# Patient Record
Sex: MALE | Race: WHITE | NOT HISPANIC OR LATINO | Employment: FULL TIME | URBAN - METROPOLITAN AREA
[De-identification: names, ages, dates, MRNs, and addresses within clinical notes are randomized per-mention and may not be internally consistent; named-entity substitution may affect disease eponyms.]

---

## 2017-11-09 PROBLEM — R00.0 TACHYCARDIA: Status: ACTIVE | Noted: 2017-11-09

## 2020-01-20 PROBLEM — E66.01 SEVERE OBESITY (BMI >= 40): Status: ACTIVE | Noted: 2020-01-20

## 2020-01-20 PROBLEM — L03.90 CELLULITIS: Status: ACTIVE | Noted: 2020-01-20

## 2020-01-20 PROBLEM — N49.2 CELLULITIS, SCROTUM: Status: ACTIVE | Noted: 2020-01-20

## 2020-01-21 PROBLEM — D64.9 ANEMIA: Status: ACTIVE | Noted: 2020-01-21

## 2020-01-22 ENCOUNTER — HOSPITAL ENCOUNTER (OUTPATIENT)
Facility: OTHER | Age: 30
Discharge: HOME | End: 2020-01-23
Attending: INTERNAL MEDICINE | Admitting: HOSPITALIST

## 2020-01-22 DIAGNOSIS — N49.2 CELLULITIS, SCROTUM: ICD-10-CM

## 2020-01-22 DIAGNOSIS — N49.2 CELLULITIS OF SCROTUM: ICD-10-CM

## 2020-01-22 PROCEDURE — 99220 PR INITIAL OBSERVATION CARE,LEVL III: CPT | Mod: ,,, | Performed by: PHYSICIAN ASSISTANT

## 2020-01-22 PROCEDURE — 99220 PR INITIAL OBSERVATION CARE,LEVL III: ICD-10-PCS | Mod: ,,, | Performed by: PHYSICIAN ASSISTANT

## 2020-01-22 PROCEDURE — G0378 HOSPITAL OBSERVATION PER HR: HCPCS

## 2020-01-22 PROCEDURE — 11000001 HC ACUTE MED/SURG PRIVATE ROOM

## 2020-01-22 PROCEDURE — G0379 DIRECT REFER HOSPITAL OBSERV: HCPCS

## 2020-01-22 RX ORDER — CITALOPRAM 10 MG/1
10 TABLET ORAL NIGHTLY
Status: DISCONTINUED | OUTPATIENT
Start: 2020-01-22 | End: 2020-01-23 | Stop reason: HOSPADM

## 2020-01-22 RX ORDER — ONDANSETRON 2 MG/ML
4 INJECTION INTRAMUSCULAR; INTRAVENOUS EVERY 6 HOURS PRN
Status: DISCONTINUED | OUTPATIENT
Start: 2020-01-22 | End: 2020-01-23 | Stop reason: HOSPADM

## 2020-01-22 RX ORDER — ENOXAPARIN SODIUM 100 MG/ML
40 INJECTION SUBCUTANEOUS EVERY 12 HOURS
Status: DISCONTINUED | OUTPATIENT
Start: 2020-01-22 | End: 2020-01-23 | Stop reason: HOSPADM

## 2020-01-22 RX ORDER — KETOROLAC TROMETHAMINE 30 MG/ML
30 INJECTION, SOLUTION INTRAMUSCULAR; INTRAVENOUS EVERY 6 HOURS PRN
Status: DISPENSED | OUTPATIENT
Start: 2020-01-22 | End: 2020-01-23

## 2020-01-22 RX ORDER — MORPHINE SULFATE 2 MG/ML
1 INJECTION, SOLUTION INTRAMUSCULAR; INTRAVENOUS EVERY 6 HOURS PRN
Status: DISCONTINUED | OUTPATIENT
Start: 2020-01-22 | End: 2020-01-23 | Stop reason: HOSPADM

## 2020-01-22 RX ORDER — TALC
6 POWDER (GRAM) TOPICAL NIGHTLY PRN
Status: DISCONTINUED | OUTPATIENT
Start: 2020-01-22 | End: 2020-01-23 | Stop reason: HOSPADM

## 2020-01-22 RX ORDER — METOPROLOL TARTRATE 50 MG/1
50 TABLET ORAL 2 TIMES DAILY
Status: DISCONTINUED | OUTPATIENT
Start: 2020-01-22 | End: 2020-01-23 | Stop reason: HOSPADM

## 2020-01-22 RX ORDER — SODIUM CHLORIDE 0.9 % (FLUSH) 0.9 %
10 SYRINGE (ML) INJECTION
Status: DISCONTINUED | OUTPATIENT
Start: 2020-01-22 | End: 2020-01-23 | Stop reason: HOSPADM

## 2020-01-22 RX ORDER — SODIUM CHLORIDE, SODIUM LACTATE, POTASSIUM CHLORIDE, CALCIUM CHLORIDE 600; 310; 30; 20 MG/100ML; MG/100ML; MG/100ML; MG/100ML
INJECTION, SOLUTION INTRAVENOUS CONTINUOUS
Status: DISCONTINUED | OUTPATIENT
Start: 2020-01-22 | End: 2020-01-23

## 2020-01-22 RX ORDER — ACETAMINOPHEN 325 MG/1
650 TABLET ORAL EVERY 4 HOURS PRN
Status: DISCONTINUED | OUTPATIENT
Start: 2020-01-22 | End: 2020-01-23 | Stop reason: HOSPADM

## 2020-01-22 RX ORDER — KETOROLAC TROMETHAMINE 30 MG/ML
15 INJECTION, SOLUTION INTRAMUSCULAR; INTRAVENOUS EVERY 6 HOURS PRN
Status: ACTIVE | OUTPATIENT
Start: 2020-01-22 | End: 2020-01-23

## 2020-01-22 RX ORDER — HYDRALAZINE HYDROCHLORIDE 20 MG/ML
10 INJECTION INTRAMUSCULAR; INTRAVENOUS EVERY 6 HOURS PRN
Status: DISCONTINUED | OUTPATIENT
Start: 2020-01-22 | End: 2020-01-23 | Stop reason: HOSPADM

## 2020-01-22 NOTE — PLAN OF CARE
Outside Transfer Acceptance Note        Patients name/MRN:   Germán De Leon, MRN: [id number]     Referring Physician or Mid-Level provider giving report:   Dr. Martha Frederick     Referral Facility:   [facility], inpatient, admit date of 1/20/20     Date/Time of Acceptance:  1/22/20 at 12:30pm     Accepting Physician for admission to hospital: ASIM Ott MD ()     Accepting facility:  [facility], inpatient     Consulting Physicians from [facility] involved in case:  Dr. Tobin, Urology at [facility]  Dr. Andrea Jimenez, Hospitalist at [facility]    Reason for transfer request:   Scrotal abscess, needs Urology services  Chief Complaint:        Chief Complaint   Patient presents with    Abscess       groin         HPI: Patient is a 29 y.o. CM with PMHx of HTN and morbid obesity who presented to the ED with scrotal pain. Patient states that he had 6/10 left sided scrotal pain 1 day PTA, progressively the same. Patient endorses pain that he describes as aching in quality, non-radiating and constant in nature. The pain is exacerbated by movement and palpation. His mother and himself attempted to drain the lesion with a needle (not sterilized, reportedly cleaned by peroxide) but reportedly only expressed blood.  He had a CT pelvis at admission which revealed cutaneous ulceration and skin thickening of scrotum, but no perineal abscess identified.  He was initially given a dose of Vanco and Zosyn, then placed on IV clindamycin.    A follow up US was done of the scrotum today which revealed edema and increased vascularity throughout the subcutaneous soft tissues of the left scrotal tissues with an associated somewhat focal area of complex material measuring 2.1 x 1.3 x 3.7 cm with a tract to the skin surface.  Due to a newly discovered abscess, the sending facility is requesting Urology services.  His WBC has decreased since admission from 16 to 10 today.     To Do List upon arrival:    1) Consult  "Urology  2) Consider changing antibiotics back to Zosyn and Vancomycin until cultures return (I discussed this with the sending MD, who will give a dose now)     VS: /67   Pulse 75   Temp 98.6 °F (37 °C)   Resp 20   Ht 5' 8" (1.727 m)   Wt (!) 148 kg (326 lb 4.5 oz)   SpO2 99%   BMI 49.61 kg/m²    Past Medical History/Surgical History:   Past Medical History:   Diagnosis Date    Hypertension     Morbid obesity with BMI of 45.0-49.9, adult 12/26/2014    Tachycardia      Review of patient's allergies indicates:   Allergen Reactions    Tramadol Other (See Comments)     heartburn     Current Facility-Administered Medications:     acetaminophen tablet 650 mg, 650 mg, Oral, Q4H PRN, Martha Frederick MD, 650 mg at 01/21/20 1727    citalopram tablet 10 mg, 10 mg, Oral, QHS, Crystal Maloney MD, 10 mg at 01/21/20 2122    clindamycin 900 MG/50 ML D5W 900 mg/50 mL IVPB 900 mg, 900 mg, Intravenous, Q8H, Martha Frederick MD    enoxaparin injection 40 mg, 40 mg, Subcutaneous, Q12H, Martha Frederick MD, 40 mg at 01/22/20 0841    hydrALAZINE injection 10 mg, 10 mg, Intravenous, Q6H PRN, Martha Frederick MD    ketorolac injection 15 mg, 15 mg, Intravenous, Q6H PRN, Martha Frederick MD, 15 mg at 01/20/20 1450    ketorolac injection 30 mg, 30 mg, Intravenous, Q6H PRN, Martha Frederick MD, 30 mg at 01/21/20 0851    lactated ringers infusion, , Intravenous, Continuous, Martha Frederick MD, Last Rate: 200 mL/hr at 01/22/20 0841    lisinopril tablet 30 mg, 30 mg, Oral, Daily, Martha Frederick MD, 30 mg at 01/22/20 0841    melatonin tablet 6 mg, 6 mg, Oral, Nightly PRN, Crystal Maloney MD, 6 mg at 01/21/20 2122    metoprolol tartrate (LOPRESSOR) tablet 50 mg, 50 mg, Oral, BID, Martha Frederick MD, 50 mg at 01/22/20 0841    morphine injection 1 mg, 1 mg, Intravenous, Q6H PRN, Martha Frederick MD    ondansetron injection 4 mg, 4 mg, Intravenous, Q6H PRN, Martha Frederick MD    " promethazine (PHENERGAN) 6.25 mg in dextrose 5 % 50 mL IVPB, 6.25 mg, Intravenous, Q6H PRN, Martha Frederick MD    sodium chloride 0.9% flush 10 mL, 10 mL, Intravenous, PRN, Marhta Frederick MD    Imaging:      CT PELVIS WITH  CONTRAST    CLINICAL HISTORY:  Left testicular/perineal pain and swelling; evaluate for testicular abscess, perineal infection, gas in tissue;    TECHNIQUE:  5 mm contiguous axial images are performed through the pelvis following administration of IV contrast.    COMPARISON:  12/28/2019    FINDINGS:  CT scan pelvis:    Scans through the pelvis show no lymphadenopathy or free fluid.    Cutaneous ulceration and skin thickening of the scrotum is noted most pronounced on the left side.    Bone windows are unremarkable      Impression       1.  Cutaneous ulceration and skin thickening of scrotum, no perineal abscess identified      Electronically signed by: Cyrus Crespo MD  Date: 01/20/2020  Time: 06:35                       ASIM Ott MD  Department of Hospital Medicine  Patient Flow Center/   [phone]

## 2020-01-23 VITALS
SYSTOLIC BLOOD PRESSURE: 153 MMHG | OXYGEN SATURATION: 95 % | HEIGHT: 68 IN | WEIGHT: 315 LBS | BODY MASS INDEX: 47.74 KG/M2 | HEART RATE: 63 BPM | RESPIRATION RATE: 18 BRPM | TEMPERATURE: 98 F | DIASTOLIC BLOOD PRESSURE: 75 MMHG

## 2020-01-23 PROBLEM — N49.2 CELLULITIS OF SCROTUM: Status: ACTIVE | Noted: 2020-01-23

## 2020-01-23 PROBLEM — N49.2 SCROTAL ABSCESS: Status: ACTIVE | Noted: 2020-01-23

## 2020-01-23 LAB
ALBUMIN SERPL BCP-MCNC: 2.9 G/DL (ref 3.5–5.2)
ALP SERPL-CCNC: 65 U/L (ref 55–135)
ALT SERPL W/O P-5'-P-CCNC: 20 U/L (ref 10–44)
ANION GAP SERPL CALC-SCNC: 9 MMOL/L (ref 8–16)
AST SERPL-CCNC: 14 U/L (ref 10–40)
BASOPHILS # BLD AUTO: 0.03 K/UL (ref 0–0.2)
BASOPHILS NFR BLD: 0.3 % (ref 0–1.9)
BILIRUB SERPL-MCNC: 0.6 MG/DL (ref 0.1–1)
BUN SERPL-MCNC: 10 MG/DL (ref 6–20)
CALCIUM SERPL-MCNC: 9.5 MG/DL (ref 8.7–10.5)
CHLORIDE SERPL-SCNC: 103 MMOL/L (ref 95–110)
CO2 SERPL-SCNC: 30 MMOL/L (ref 23–29)
CREAT SERPL-MCNC: 0.9 MG/DL (ref 0.5–1.4)
DIFFERENTIAL METHOD: ABNORMAL
EOSINOPHIL # BLD AUTO: 0.3 K/UL (ref 0–0.5)
EOSINOPHIL NFR BLD: 2.9 % (ref 0–8)
ERYTHROCYTE [DISTWIDTH] IN BLOOD BY AUTOMATED COUNT: 12.2 % (ref 11.5–14.5)
EST. GFR  (AFRICAN AMERICAN): >60 ML/MIN/1.73 M^2
EST. GFR  (NON AFRICAN AMERICAN): >60 ML/MIN/1.73 M^2
GLUCOSE SERPL-MCNC: 109 MG/DL (ref 70–110)
HCT VFR BLD AUTO: 40.7 % (ref 40–54)
HGB BLD-MCNC: 13 G/DL (ref 14–18)
IMM GRANULOCYTES # BLD AUTO: 0.04 K/UL (ref 0–0.04)
IMM GRANULOCYTES NFR BLD AUTO: 0.4 % (ref 0–0.5)
LYMPHOCYTES # BLD AUTO: 1.7 K/UL (ref 1–4.8)
LYMPHOCYTES NFR BLD: 17.2 % (ref 18–48)
MAGNESIUM SERPL-MCNC: 2 MG/DL (ref 1.6–2.6)
MCH RBC QN AUTO: 28.6 PG (ref 27–31)
MCHC RBC AUTO-ENTMCNC: 31.9 G/DL (ref 32–36)
MCV RBC AUTO: 90 FL (ref 82–98)
MONOCYTES # BLD AUTO: 1 K/UL (ref 0.3–1)
MONOCYTES NFR BLD: 9.7 % (ref 4–15)
NEUTROPHILS # BLD AUTO: 6.8 K/UL (ref 1.8–7.7)
NEUTROPHILS NFR BLD: 69.5 % (ref 38–73)
NRBC BLD-RTO: 0 /100 WBC
PHOSPHATE SERPL-MCNC: 4.7 MG/DL (ref 2.7–4.5)
PLATELET # BLD AUTO: 321 K/UL (ref 150–350)
PMV BLD AUTO: 9.2 FL (ref 9.2–12.9)
POTASSIUM SERPL-SCNC: 4.6 MMOL/L (ref 3.5–5.1)
PROT SERPL-MCNC: 6.7 G/DL (ref 6–8.4)
RBC # BLD AUTO: 4.55 M/UL (ref 4.6–6.2)
SODIUM SERPL-SCNC: 142 MMOL/L (ref 136–145)
WBC # BLD AUTO: 9.82 K/UL (ref 3.9–12.7)

## 2020-01-23 PROCEDURE — 87186 SC STD MICRODIL/AGAR DIL: CPT

## 2020-01-23 PROCEDURE — 99217 PR OBSERVATION CARE DISCHARGE: CPT | Mod: ,,, | Performed by: PHYSICIAN ASSISTANT

## 2020-01-23 PROCEDURE — 84100 ASSAY OF PHOSPHORUS: CPT

## 2020-01-23 PROCEDURE — 87077 CULTURE AEROBIC IDENTIFY: CPT

## 2020-01-23 PROCEDURE — 87070 CULTURE OTHR SPECIMN AEROBIC: CPT

## 2020-01-23 PROCEDURE — 83735 ASSAY OF MAGNESIUM: CPT

## 2020-01-23 PROCEDURE — 36415 COLL VENOUS BLD VENIPUNCTURE: CPT

## 2020-01-23 PROCEDURE — 96374 THER/PROPH/DIAG INJ IV PUSH: CPT

## 2020-01-23 PROCEDURE — 63600175 PHARM REV CODE 636 W HCPCS: Performed by: PHYSICIAN ASSISTANT

## 2020-01-23 PROCEDURE — 25000003 PHARM REV CODE 250: Performed by: STUDENT IN AN ORGANIZED HEALTH CARE EDUCATION/TRAINING PROGRAM

## 2020-01-23 PROCEDURE — 25000003 PHARM REV CODE 250: Performed by: PHYSICIAN ASSISTANT

## 2020-01-23 PROCEDURE — G0378 HOSPITAL OBSERVATION PER HR: HCPCS

## 2020-01-23 PROCEDURE — 99217 PR OBSERVATION CARE DISCHARGE: ICD-10-PCS | Mod: ,,, | Performed by: PHYSICIAN ASSISTANT

## 2020-01-23 PROCEDURE — 87075 CULTR BACTERIA EXCEPT BLOOD: CPT

## 2020-01-23 PROCEDURE — 99243 PR OFFICE CONSULTATION,LEVEL III: ICD-10-PCS | Mod: ,,, | Performed by: UROLOGY

## 2020-01-23 PROCEDURE — 99243 OFF/OP CNSLTJ NEW/EST LOW 30: CPT | Mod: ,,, | Performed by: UROLOGY

## 2020-01-23 PROCEDURE — 85025 COMPLETE CBC W/AUTO DIFF WBC: CPT

## 2020-01-23 PROCEDURE — 80053 COMPREHEN METABOLIC PANEL: CPT

## 2020-01-23 RX ORDER — LIDOCAINE HYDROCHLORIDE 10 MG/ML
1 INJECTION INFILTRATION; PERINEURAL ONCE
Status: COMPLETED | OUTPATIENT
Start: 2020-01-23 | End: 2020-01-23

## 2020-01-23 RX ORDER — HYDROXYZINE HYDROCHLORIDE 25 MG/1
25 TABLET, FILM COATED ORAL 3 TIMES DAILY PRN
Status: DISCONTINUED | OUTPATIENT
Start: 2020-01-23 | End: 2020-01-23 | Stop reason: HOSPADM

## 2020-01-23 RX ORDER — OXYCODONE AND ACETAMINOPHEN 5; 325 MG/1; MG/1
1 TABLET ORAL
Qty: 10 EACH | Refills: 0 | Status: SHIPPED | OUTPATIENT
Start: 2020-01-23 | End: 2020-02-03 | Stop reason: ALTCHOICE

## 2020-01-23 RX ORDER — ALPRAZOLAM 0.25 MG/1
0.25 TABLET ORAL ONCE AS NEEDED
Status: COMPLETED | OUTPATIENT
Start: 2020-01-23 | End: 2020-01-23

## 2020-01-23 RX ORDER — SULFAMETHOXAZOLE AND TRIMETHOPRIM 800; 160 MG/1; MG/1
1 TABLET ORAL 2 TIMES DAILY
Qty: 20 TABLET | Refills: 0 | Status: SHIPPED | OUTPATIENT
Start: 2020-01-23 | End: 2020-02-03 | Stop reason: ALTCHOICE

## 2020-01-23 RX ADMIN — METOPROLOL TARTRATE 50 MG: 50 TABLET, FILM COATED ORAL at 09:01

## 2020-01-23 RX ADMIN — PIPERACILLIN AND TAZOBACTAM 4.5 G: 4; .5 INJECTION, POWDER, LYOPHILIZED, FOR SOLUTION INTRAVENOUS; PARENTERAL at 10:01

## 2020-01-23 RX ADMIN — KETOROLAC TROMETHAMINE 30 MG: 30 INJECTION, SOLUTION INTRAMUSCULAR at 12:01

## 2020-01-23 RX ADMIN — LISINOPRIL 30 MG: 20 TABLET ORAL at 09:01

## 2020-01-23 RX ADMIN — ONDANSETRON 4 MG: 2 INJECTION INTRAMUSCULAR; INTRAVENOUS at 04:01

## 2020-01-23 RX ADMIN — LIDOCAINE HYDROCHLORIDE 1 ML: 10 INJECTION, SOLUTION INFILTRATION; PERINEURAL at 09:01

## 2020-01-23 RX ADMIN — VANCOMYCIN HYDROCHLORIDE 2000 MG: 100 INJECTION, POWDER, LYOPHILIZED, FOR SOLUTION INTRAVENOUS at 06:01

## 2020-01-23 RX ADMIN — SODIUM CHLORIDE, SODIUM LACTATE, POTASSIUM CHLORIDE, AND CALCIUM CHLORIDE: .6; .31; .03; .02 INJECTION, SOLUTION INTRAVENOUS at 06:01

## 2020-01-23 RX ADMIN — ALPRAZOLAM 0.25 MG: 0.25 TABLET ORAL at 09:01

## 2020-01-23 RX ADMIN — SODIUM CHLORIDE, SODIUM LACTATE, POTASSIUM CHLORIDE, AND CALCIUM CHLORIDE: .6; .31; .03; .02 INJECTION, SOLUTION INTRAVENOUS at 12:01

## 2020-01-23 RX ADMIN — ENOXAPARIN SODIUM 40 MG: 100 INJECTION SUBCUTANEOUS at 12:01

## 2020-01-23 RX ADMIN — METOPROLOL TARTRATE 50 MG: 50 TABLET, FILM COATED ORAL at 12:01

## 2020-01-23 RX ADMIN — CITALOPRAM HYDROBROMIDE 10 MG: 10 TABLET ORAL at 12:01

## 2020-01-23 NOTE — HPI
Mr. De Leon is a 29 y.o. malewith PMH of HTN and morbid obesity, who presented to [facility] with scrotal pain x1 day on 01/20/2020.  He found a lesion on his scrotum, which he attempted to drain with a non sterilized needle.  When he presented to the ED he was found to have cellulitis of the scrotum. Scrotal US from 1/20 showed .  He was admitted and started on IV clindamycin, which improved the redness.  He was then switched oral clindamycin, which caused worsening symptoms. He began to develop purulent drainage from the scrotum. He was switched back to IV clindamycin. On 1/22, a repeat ultrasound showed a likely abscess in the left hemiscrotum. He was then transferred to [facility] for evaluation by Urology for possible drainage. He is admitted to Hospital Medicine. Currently on Vanc and Zosyn. Initially leukocytosis at first admission (WBC 16) has now resolved. He has been afebrile with stable vitals. He reports no history of scrotal abscesses prior, and no history of DM.

## 2020-01-23 NOTE — CONSULTS
[facility]  Urology  Consult Note    Patient Name: Germán De Leon Jr.  MRN: [id number]  Admission Date: 1/22/2020  Hospital Length of Stay: 1   Code Status: Full Code   Attending Provider: Bright Pimentel MD   Consulting Provider: Hong Velázquez MD  Primary Care Physician: PEGGY Knutson  Principal Problem:Cellulitis, scrotum    Inpatient consult to Urology  Consult performed by: Hong Velázquez MD  Consult ordered by: Tonia Warner PA-C          Subjective:     HPI:  Mr. De Leon is a 29 y.o. malewith PMH of HTN and morbid obesity, who presented to [facility] with scrotal pain x1 day on 01/20/2020.  He found a lesion on his scrotum, which he attempted to drain with a non sterilized needle.  When he presented to the ED he was found to have cellulitis of the scrotum. Scrotal US from 1/20 showed .  He was admitted and started on IV clindamycin, which improved the redness.  He was then switched oral clindamycin, which caused worsening symptoms. He began to develop purulent drainage from the scrotum. He was switched back to IV clindamycin. On 1/22, a repeat ultrasound showed a likely abscess in the left hemiscrotum. He was then transferred to [facility] for evaluation by Urology for possible drainage. He is admitted to Hospital Medicine. Currently on Vanc and Zosyn. Initially leukocytosis at first admission (WBC 16) has now resolved. He has been afebrile with stable vitals. He reports no history of scrotal abscesses prior, and no history of DM.    Past Medical History:   Diagnosis Date    Hypertension     Morbid obesity with BMI of 45.0-49.9, adult 12/26/2014    Tachycardia        Past Surgical History:   Procedure Laterality Date    APPENDECTOMY      INNER EAR SURGERY Bilateral        Review of patient's allergies indicates:   Allergen Reactions    Tramadol Other (See Comments)     heartburn       Family History     Problem Relation (Age of Onset)    Hypertension  Mother, Father          Tobacco Use    Smoking status: Never Smoker    Smokeless tobacco: Never Used   Substance and Sexual Activity    Alcohol use: Yes     Comment: occasional    Drug use: No    Sexual activity: Not Currently       Review of Systems   Constitutional: Negative for chills and fever.   HENT: Negative for trouble swallowing.    Respiratory: Negative for shortness of breath.    Cardiovascular: Negative for chest pain.   Gastrointestinal: Positive for nausea. Negative for vomiting.   Genitourinary: Positive for scrotal swelling. Negative for difficulty urinating and hematuria.   Musculoskeletal: Negative for myalgias.   Skin: Positive for wound.   Neurological: Negative for weakness.   Hematological: Does not bruise/bleed easily.   Psychiatric/Behavioral: Negative for confusion.       Objective:     Temp:  [96.5 °F (35.8 °C)-98.8 °F (37.1 °C)] 96.5 °F (35.8 °C)  Pulse:  [71-82] 71  Resp:  [18-20] 18  SpO2:  [79 %-99 %] 95 %  BP: (118-135)/(67-79) 132/79     Body mass index is 49.61 kg/m².           Drains     None                 Physical Exam   Constitutional: He is oriented to person, place, and time. He appears well-developed and well-nourished.   obese   HENT:   Head: Normocephalic and atraumatic.   Eyes: Conjunctivae are normal.   Neck: Normal range of motion.   Cardiovascular: Normal rate.    Pulmonary/Chest: Effort normal. No respiratory distress.   Abdominal: Soft. He exhibits no distension. There is no tenderness.   obese   Genitourinary:   Genitourinary Comments: Uncircumcised penis without phimosis; testes descended bilaterally, epididymides and spermatic cords unremarkable; left hemiscrotum with erythematous, indurated 3 cm lesion with purulent drainage, suspicious for abscess   Musculoskeletal: Normal range of motion.   Neurological: He is alert and oriented to person, place, and time.   Skin: Skin is warm and dry.     Psychiatric: He has a normal mood and affect. His behavior is  normal. Judgment and thought content normal.       Significant Labs:    BMP:  Recent Labs   Lab 01/21/20  0445 01/22/20  0415 01/23/20  0331    140 142   K 4.5 4.5 4.6    102 103   CO2 28 28 30*   BUN 15 15 10   CREATININE 1.0 0.9 0.9   CALCIUM 9.1 9.3 9.5       CBC:  Recent Labs   Lab 01/21/20  0445 01/22/20  0415 01/23/20  0331   WBC 12.31 9.93 9.82   HGB 13.2* 13.4* 13.0*   HCT 39.5* 41.5 40.7    244 321       All pertinent labs results from the past 24 hours have been reviewed.    Significant Imaging:  All pertinent imaging results/findings from the past 24 hours have been reviewed.    US - bilateral testes and epididymides unremarkable. Left hemiscrotum with area of mixed echogenicity suspicious for abscess  I have personally reviewed and interpreted these images.                  Assessment and Plan:     * Cellulitis, scrotum  - see plan for Scrotal abscess    Scrotal abscess  - continue IV antibiotics; can transition to PO per primary; recommend Bactrim x 10 days  - will plan for I&D of scrotal abscess at bedside later today; will send wound culture  - after I&D, patient will likely be stable for discharge        VTE Risk Mitigation (From admission, onward)         Ordered     enoxaparin injection 40 mg  Every 12 hours      01/22/20 9046                Thank you for your consult. I will follow-up with patient. Please contact us if you have any additional questions.    Hong Velázquez MD  Urology  [facility]

## 2020-01-23 NOTE — TREATMENT PLAN
1/23/2020    Patient: Germán De Leon Jr.    YOB: 1990    To whom it may concern,    Germán De Leon Jr. was admitted to the hospital under my care on 1/22/2020 and was discharged on 1/23/2020.  Cleared to return to work on Wednesday 1/29/2020 on light duty until follow up with his Primary Care Provider.    If you have any questions or concerns, please don't hesitate to contact the department of hospital medicine at [phone].    Sincerely,                  Estrellita Martinez PA-C  Department of Hospital Medicine

## 2020-01-23 NOTE — ASSESSMENT & PLAN NOTE
- mr. Dunlap Naman De Leon Jr. is admitted to inpatient status   - he is transferred from [facility]   - [facility]/zosyn  - consult urology

## 2020-01-23 NOTE — HOSPITAL COURSE
29 y.o. Male with PMH of HTN and morbid obesity admitted at [facility] with scrotal pain x1 day on 01/20/2020 with lesion on his scrotum, which he attempted to drain with a non sterilized needle, found to have cellulitis of the scrotum and placed on Clindamycin. Scrotal US from 1/20 showed Normal testicular ultrasound; findings consistent with scrotal cellulitis; however course was complicated by worsening symptoms and repeat US 1/22 showed a likely abscess in the left hemiscrotum. He was then transferred to [facility] for evaluation by Urology for possible drainage. Patient was placed on Vanc and Zosyn. Initial  leukocytosis at first admission (WBC 16) has now resolved, patient remains afebrile. Patient underwent bedside I&D by Urology, cultures sent. Urology recommendation for local wound care, packing and Bactrim x 10 days. Patient and mother feel comfortable managing wound at home. Vitals stable, discharged home.

## 2020-01-23 NOTE — HPI
Mr. Germán De Leon Jr. is a 29 y.o. male, with PMH of HTN, morbid obesity, who presented to [facility] with scrotal pain x1 day on 01/20/2020.  He found a lesion on his scrotum, which was attempted to be drained by himself and his mother with a non sterilized needle cleaned with peroxide only, and expressed blood at that time.  When he presented to the ED he was found to have cellulitis of the scrotum.  He denied dysuria, urgency, frequency, history of diabetes, polyuria, polydipsia.  He admitted to occasional alcohol use.  In the ED and ultrasound showed cellulitis of the scrotum with an abscess.  He was started on IV clindamycin, and admitted to the hospital.  He was then switched oral:  Clindamycin as he appeared to be improving, but the erythema and edema of the scrotum increased and purulent drainage began and thus he was switched back to IV clindamycin.  At the time he was switched back to IV medications, a repeat ultrasound showed a small focal area of clot complex material tracking towards the skin.  As there was no neurology available at [facility], the patient was transferred to [facility] for possible I and D of the scrotum by Urology at this facility he was then started on vancomycin/Zosyn.  He was inpatient status.

## 2020-01-23 NOTE — ASSESSMENT & PLAN NOTE
- continue IV antibiotics; can transition to PO per primary; recommend Bactrim x 10 days  - will plan for I&D of scrotal abscess at bedside later today; will send wound culture  - after I&D, patient will likely be stable for discharge

## 2020-01-23 NOTE — H&P
[facility] Medicine  History & Physical    Patient Name: Germán De Leon Jr.  MRN: [id number]  Admission Date: 1/22/2020  Attending Physician: Bright Pimentel MD   Primary Care Provider: PEGGY Knutson         Patient information was obtained from patient, parent, past medical records and ER records.     Subjective:     Principal Problem:Cellulitis, scrotum    Chief Complaint: No chief complaint on file.       HPI: Mr. Germán De Leon Jr. is a 29 y.o. male, with PMH of HTN, morbid obesity, who presented to [facility] with scrotal pain x1 day on 01/20/2020.  He found a lesion on his scrotum, which was attempted to be drained by himself and his mother with a non sterilized needle cleaned with peroxide only, and expressed blood at that time.  When he presented to the ED he was found to have cellulitis of the scrotum.  He denied dysuria, urgency, frequency, history of diabetes, polyuria, polydipsia.  He admitted to occasional alcohol use.  In the ED and ultrasound showed cellulitis of the scrotum with an abscess.  He was started on IV clindamycin, and admitted to the hospital.  He was then switched oral:  Clindamycin as he appeared to be improving, but the erythema and edema of the scrotum increased and purulent drainage began and thus he was switched back to IV clindamycin.  At the time he was switched back to IV medications, a repeat ultrasound showed a small focal area of clot complex material tracking towards the skin.  As there was no neurology available at [facility], the patient was transferred to [facility] for possible I and D of the scrotum by Urology at this facility he was then started on vancomycin/Zosyn.  He was inpatient status.    Past Medical History:   Diagnosis Date    Hypertension     Morbid obesity with BMI of 45.0-49.9, adult 12/26/2014    Tachycardia        Past Surgical History:   Procedure Laterality Date    APPENDECTOMY      INNER EAR SURGERY Bilateral         Review of patient's allergies indicates:   Allergen Reactions    Tramadol Other (See Comments)     heartburn       Current Facility-Administered Medications on File Prior to Encounter   Medication    [] lactated ringers infusion    [COMPLETED] piperacillin-tazobactam 4.5 g in dextrose 5 % 100 mL IVPB (ready to mix system)    [COMPLETED] vancomycin (VANCOCIN) 3,000 mg in dextrose 5 % 500 mL IVPB    [DISCONTINUED] acetaminophen tablet 650 mg    [DISCONTINUED] citalopram tablet 10 mg    [DISCONTINUED] clindamycin 900 MG/50 ML D5W 900 mg/50 mL IVPB 900 mg    [DISCONTINUED] clindamycin 900 MG/50 ML D5W 900 mg/50 mL IVPB 900 mg    [DISCONTINUED] clindamycin capsule 600 mg    [DISCONTINUED] enoxaparin injection 40 mg    [DISCONTINUED] hydrALAZINE injection 10 mg    [DISCONTINUED] ketorolac injection 15 mg    [DISCONTINUED] ketorolac injection 30 mg    [DISCONTINUED] lactated ringers infusion    [DISCONTINUED] lisinopril tablet 30 mg    [DISCONTINUED] melatonin tablet 6 mg    [DISCONTINUED] metoprolol tartrate (LOPRESSOR) tablet 50 mg    [DISCONTINUED] morphine injection 1 mg    [DISCONTINUED] ondansetron injection 4 mg    [DISCONTINUED] promethazine (PHENERGAN) 6.25 mg in dextrose 5 % 50 mL IVPB    [DISCONTINUED] sodium chloride 0.9% flush 10 mL    [DISCONTINUED] vancomycin (VANCOCIN) 3,000 mg in dextrose 5 % 500 mL IVPB    [DISCONTINUED] vancomycin (VANCOCIN) 3,000 mg in dextrose 5 % 500 mL IVPB    [DISCONTINUED] vancomycin - pharmacy to dose    [DISCONTINUED] vancomycin 2 g in dextrose 5 % 500 mL IVPB     Current Outpatient Medications on File Prior to Encounter   Medication Sig    albuterol (PROVENTIL/VENTOLIN HFA) 90 mcg/actuation inhaler Inhale 1-2 puffs into the lungs every 6 (six) hours as needed. Rescue    citalopram (CELEXA) 10 MG tablet Take 1 tablet (10 mg total) by mouth once daily.    ibuprofen (ADVIL,MOTRIN) 800 MG tablet Take 1 tablet (800 mg total) by mouth every 6  (six) hours as needed.    lisinopril (PRINIVIL,ZESTRIL) 30 MG tablet Take 1 tablet (30 mg total) by mouth once daily.    metoprolol tartrate (LOPRESSOR) 50 MG tablet Take 1 tablet (50 mg total) by mouth 2 (two) times daily.    omeprazole (PRILOSEC) 20 MG capsule Take 1 capsule (20 mg total) by mouth once daily.    ondansetron (ZOFRAN) 4 MG tablet Take 1 tablet (4 mg total) by mouth every 8 (eight) hours as needed. (Patient not taking: Reported on 1/8/2020)     Family History     Problem Relation (Age of Onset)    Hypertension Mother, Father        Tobacco Use    Smoking status: Never Smoker    Smokeless tobacco: Never Used   Substance and Sexual Activity    Alcohol use: Yes     Comment: occasional    Drug use: No    Sexual activity: Not Currently     Review of Systems   Constitutional: Negative for activity change, appetite change, chills, diaphoresis and fever.   Respiratory: Negative for cough, shortness of breath and wheezing.    Cardiovascular: Negative for chest pain and palpitations.   Gastrointestinal: Positive for nausea and vomiting. Negative for abdominal pain, constipation and diarrhea.   Genitourinary: Positive for scrotal swelling and testicular pain. Negative for decreased urine volume, difficulty urinating, discharge, dysuria, frequency, hematuria, penile pain, penile swelling and urgency.   Musculoskeletal: Negative for back pain.   Skin: Positive for color change (left sided scrotal erythema). Negative for rash and wound.   Neurological: Negative for dizziness, weakness, light-headedness and numbness.   Psychiatric/Behavioral: Negative for confusion and decreased concentration. The patient is nervous/anxious.      Objective:     Vital Signs (Most Recent):  Temp: 96.5 °F (35.8 °C) (01/23/20 0509)  Pulse: 71 (01/23/20 0509)  Resp: 18 (01/23/20 0509)  BP: 132/79 (01/23/20 0509)  SpO2: 95 % (01/23/20 0509) Vital Signs (24h Range):  Temp:  [96.5 °F (35.8 °C)-98.8 °F (37.1 °C)] 96.5 °F (35.8  °C)  Pulse:  [71-82] 71  Resp:  [18-20] 18  SpO2:  [79 %-99 %] 95 %  BP: (118-135)/(67-79) 132/79     Weight: (!) 148 kg (326 lb 4.5 oz)  Body mass index is 49.61 kg/m².    Physical Exam   Constitutional: He is oriented to person, place, and time. Vital signs are normal. He appears well-developed and well-nourished.  Non-toxic appearance. He does not have a sickly appearance. He does not appear ill. No distress.   Obese male.    HENT:   Head: Normocephalic and atraumatic.   Right Ear: External ear normal.   Left Ear: External ear normal.   Eyes: Pupils are equal, round, and reactive to light. Conjunctivae and EOM are normal. No scleral icterus.   Neck: Normal range of motion. Neck supple. No JVD present. No tracheal deviation present.   Cardiovascular: Normal rate, regular rhythm, normal heart sounds and intact distal pulses. Exam reveals no gallop and no friction rub.   No murmur heard.  Pulmonary/Chest: Effort normal and breath sounds normal. No stridor. No respiratory distress. He has no wheezes. He has no rales.   Abdominal: Soft. Bowel sounds are normal. He exhibits no distension and no mass. There is no tenderness. There is no guarding.   Genitourinary:   Genitourinary Comments: Left side of scrotum with erythema, and a palpable indurated and fluctuant area of the anterior aspect of the left testicle with pinpoint amount of purulent drainage.    Musculoskeletal: Normal range of motion. He exhibits no edema or deformity.   Neurological: He is alert and oriented to person, place, and time. No cranial nerve deficit. He exhibits normal muscle tone. Coordination normal.   Skin: Skin is warm and dry. He is not diaphoretic.   Psychiatric: He has a normal mood and affect. His behavior is normal. Judgment and thought content normal.   Nursing note and vitals reviewed.        CRANIAL NERVES     CN III, IV, VI   Pupils are equal, round, and reactive to light.  Extraocular motions are normal.        Significant Labs:    BMP:   Recent Labs   Lab 01/23/20  0331         K 4.6      CO2 30*   BUN 10   CREATININE 0.9   CALCIUM 9.5   MG 2.0     CBC:   Recent Labs   Lab 01/22/20  0415 01/23/20  0331   WBC 9.93 9.82   HGB 13.4* 13.0*   HCT 41.5 40.7    321     CMP:   Recent Labs   Lab 01/22/20  0415 01/23/20  0331    142   K 4.5 4.6    103   CO2 28 30*   * 109   BUN 15 10   CREATININE 0.9 0.9   CALCIUM 9.3 9.5   PROT 6.9 6.7   ALBUMIN 3.0* 2.9*   BILITOT 0.7 0.6   ALKPHOS 66 65   AST 12 14   ALT 17 20   ANIONGAP 10 9   EGFRNONAA >60.0 >60     Urine Culture: No results for input(s): LABURIN in the last 48 hours.  Urine Studies: No results for input(s): COLORU, APPEARANCEUA, PHUR, SPECGRAV, PROTEINUA, GLUCUA, KETONESU, BILIRUBINUA, OCCULTUA, NITRITE, UROBILINOGEN, LEUKOCYTESUR, RBCUA, WBCUA, BACTERIA, SQUAMEPITHEL, HYALINECASTS in the last 48 hours.    Invalid input(s): WRIGHTSUR  All pertinent labs within the past 24 hours have been reviewed.    Significant Imaging: I have reviewed all pertinent imaging results/findings within the past 24 hours.      US Soft Tissue Misc   Order: [id number]   Status:  Final result   Visible to patient:  Yes (Patient Portal) Next appt:  02/17/2020 at 02:20 PM in Internal Medicine (PEGGY Knutson)   Details     Reading Physician Reading Date Result Priority   Bethanie Styles MD 1/22/2020 Routine      Narrative     EXAMINATION:  US SOFT TISSUE [facility]    CLINICAL HISTORY:  r/o abscess in draining wound;    TECHNIQUE:  Limited ultrasound images of the area of concern were obtained.    COMPARISON:  Scrotal ultrasound 01/28/2020    FINDINGS:  Limited ultrasound assessment of the subcutaneous soft tissues of the left scrotum show edema and phlegmon throughout the subcutaneous soft tissues of the left scrotum, increased from the prior exam.  A somewhat focal area of complex material is noted in this region measuring at least 2.1 x 1.3 x 3.7 cm with a tract to the skin  surface.  Increased vascularity is noted throughout this area.      Impression       Edema and increased vascularity throughout the subcutaneous soft tissues of the left scrotal tissues with an associated somewhat focal area of complex material measuring 2.1 x 1.3 x 3.7 cm with a tract to the skin surface.      Electronically signed by: Bethanie Styles MD  Date: 01/22/2020  Time: 08:21               Assessment/Plan:     * Cellulitis, scrotum  - mr. Germán Florence Moises Pacheco. is admitted to inpatient status   - he is [facility] from [facility]   - continue [facility]/zosyn  - consult urology     HTN (hypertension)  - normotensive at present  - continue: lisinopril & metoprolol  - monitor     VTE Risk Mitigation (From admission, onward)         Ordered     enoxaparin injection 40 mg  Every 12 hours      01/22/20 3569                   Tonia Warner PA-C  Department of Hospital Medicine   [facility]

## 2020-01-23 NOTE — PROCEDURES
"Germán De Leon Jr. is a 29 y.o. male patient.    Temp: 98.3 °F (36.8 °C) (01/23/20 0833)  Pulse: 81 (01/23/20 0833)  Resp: 18 (01/23/20 0833)  BP: 136/79 (01/23/20 0833)  SpO2: 95 % (01/23/20 0833)  Weight: (!) 148 kg (326 lb 4.5 oz) (01/22/20 2100)  Height: 5' 8" (172.7 cm) (01/22/20 2100)       Incision and Drainage  Date/Time: 1/23/2020 10:04 AM  Performed by: Hong Velázquez MD  Authorized by: Hong Velázquez MD   Type: abscess  Location: scrotum.  Anesthesia: local infiltration    Anesthesia:  Local anesthesia used: yes  Local Anesthetic: lidocaine 2% without epinephrine (lidocaine 2% without epinephrine)  Anesthetic total: 9 mL  Patient sedated: no  Scalpel size: 11  Incision type: single straight  Complexity: simple  Drainage: bloody and  purulent  Drainage amount: moderate  Wound treatment: incision and  drainage  Packing material: 1/4 in iodoform gauze  Complications: No  Estimated blood loss (mL): 2  Specimens: Yes (wound culture)  Patient tolerance: Patient tolerated the procedure well with no immediate complications      Patient will need packing changes daily until wound has healed    Hong Velázquez  1/23/2020  "

## 2020-01-23 NOTE — PLAN OF CARE
CM met with pt and mother at bedside for initial discharge planning assessment. Pt is alert and oriented at time of assessment.    Pt confirmed all demographic information is correct in Epic. Pt verified his PCP is PEGGY Light, and pharmacy of choice is [facility] in [place], [place].    Pt is independent with ADL's, lives with his mother, works full time. He states he uses no DME and has not had HH. Pt is not on coumadin and not on HD.    Pt states he has no LW and POA at this time.     Pt has urology consult.    CM to follow for plans and arrangement.s     01/23/20 0863   Discharge Assessment   Assessment Type Discharge Planning Assessment   Confirmed/corrected address and phone number on facesheet? Yes   Assessment information obtained from? Patient;Caregiver;Medical Record   Expected Length of Stay (days) 3   Communicated expected length of stay with patient/caregiver yes   Prior to hospitilization cognitive status: Alert/Oriented   Prior to hospitalization functional status: Independent   Current cognitive status: Alert/Oriented   Current Functional Status: Independent   Lives With parent(s)   Able to Return to Prior Arrangements yes   Is patient able to care for self after discharge? Yes   Patient's perception of discharge disposition home or selfcare   Readmission Within the Last 30 Days previous discharge plan unsuccessful   Patient currently being followed by outpatient case management? No   Patient currently receives any other outside agency services? No   Equipment Currently Used at Home none   Do you have any problems affording any of your prescribed medications? No   Is the patient taking medications as prescribed? yes   Does the patient have transportation home? Yes   Transportation Anticipated family or friend will provide   Does the patient receive services at the Coumadin Clinic? No   Discharge Plan A Home   Discharge Plan B Home   DME Needed Upon Discharge  none   Patient/Family  in Agreement with Plan no

## 2020-01-23 NOTE — PLAN OF CARE
Final note:  CM met with pt and mother at bedside for final discharge planning assessment.   pharmacy of choice is [facility] in [place], [place].  Pt is independent with ADL's, lives with his mother, works full time.   Mr De Leon urology consult completed  See PCP and a urologist near your home ([place]) as discussed  CM to follow as needed  Will d/c after infusion       01/23/20 1156   Final Note   Assessment Type Final Discharge Note   Anticipated Discharge Disposition Home   Hospital Follow Up  Appt(s) scheduled? Yes   Discharge plans and expectations educations in teach back method with documentation complete? Yes   Right Care Referral Info   Post Acute Recommendation No Care

## 2020-01-23 NOTE — SUBJECTIVE & OBJECTIVE
Past Medical History:   Diagnosis Date    Hypertension     Morbid obesity with BMI of 45.0-49.9, adult 2014    Tachycardia        Past Surgical History:   Procedure Laterality Date    APPENDECTOMY      INNER EAR SURGERY Bilateral        Review of patient's allergies indicates:   Allergen Reactions    Tramadol Other (See Comments)     heartburn       Current Facility-Administered Medications on File Prior to Encounter   Medication    [] lactated ringers infusion    [COMPLETED] piperacillin-tazobactam 4.5 g in dextrose 5 % 100 mL IVPB (ready to mix system)    [COMPLETED] vancomycin (VANCOCIN) 3,000 mg in dextrose 5 % 500 mL IVPB    [DISCONTINUED] acetaminophen tablet 650 mg    [DISCONTINUED] citalopram tablet 10 mg    [DISCONTINUED] clindamycin 900 MG/50 ML D5W 900 mg/50 mL IVPB 900 mg    [DISCONTINUED] clindamycin 900 MG/50 ML D5W 900 mg/50 mL IVPB 900 mg    [DISCONTINUED] clindamycin capsule 600 mg    [DISCONTINUED] enoxaparin injection 40 mg    [DISCONTINUED] hydrALAZINE injection 10 mg    [DISCONTINUED] ketorolac injection 15 mg    [DISCONTINUED] ketorolac injection 30 mg    [DISCONTINUED] lactated ringers infusion    [DISCONTINUED] lisinopril tablet 30 mg    [DISCONTINUED] melatonin tablet 6 mg    [DISCONTINUED] metoprolol tartrate (LOPRESSOR) tablet 50 mg    [DISCONTINUED] morphine injection 1 mg    [DISCONTINUED] ondansetron injection 4 mg    [DISCONTINUED] promethazine (PHENERGAN) 6.25 mg in dextrose 5 % 50 mL IVPB    [DISCONTINUED] sodium chloride 0.9% flush 10 mL    [DISCONTINUED] vancomycin (VANCOCIN) 3,000 mg in dextrose 5 % 500 mL IVPB    [DISCONTINUED] vancomycin (VANCOCIN) 3,000 mg in dextrose 5 % 500 mL IVPB    [DISCONTINUED] vancomycin - pharmacy to dose    [DISCONTINUED] vancomycin 2 g in dextrose 5 % 500 mL IVPB     Current Outpatient Medications on File Prior to Encounter   Medication Sig    albuterol (PROVENTIL/VENTOLIN HFA) 90 mcg/actuation inhaler  Inhale 1-2 puffs into the lungs every 6 (six) hours as needed. Rescue    citalopram (CELEXA) 10 MG tablet Take 1 tablet (10 mg total) by mouth once daily.    ibuprofen (ADVIL,MOTRIN) 800 MG tablet Take 1 tablet (800 mg total) by mouth every 6 (six) hours as needed.    lisinopril (PRINIVIL,ZESTRIL) 30 MG tablet Take 1 tablet (30 mg total) by mouth once daily.    metoprolol tartrate (LOPRESSOR) 50 MG tablet Take 1 tablet (50 mg total) by mouth 2 (two) times daily.    omeprazole (PRILOSEC) 20 MG capsule Take 1 capsule (20 mg total) by mouth once daily.    ondansetron (ZOFRAN) 4 MG tablet Take 1 tablet (4 mg total) by mouth every 8 (eight) hours as needed. (Patient not taking: Reported on 1/8/2020)     Family History     Problem Relation (Age of Onset)    Hypertension Mother, Father        Tobacco Use    Smoking status: Never Smoker    Smokeless tobacco: Never Used   Substance and Sexual Activity    Alcohol use: Yes     Comment: occasional    Drug use: No    Sexual activity: Not Currently     Review of Systems   Constitutional: Negative for activity change, appetite change, chills, diaphoresis and fever.   Respiratory: Negative for cough, shortness of breath and wheezing.    Cardiovascular: Negative for chest pain and palpitations.   Gastrointestinal: Positive for nausea and vomiting. Negative for abdominal pain, constipation and diarrhea.   Genitourinary: Positive for scrotal swelling and testicular pain. Negative for decreased urine volume, difficulty urinating, discharge, dysuria, frequency, hematuria, penile pain, penile swelling and urgency.   Musculoskeletal: Negative for back pain.   Skin: Positive for color change (left sided scrotal erythema). Negative for rash and wound.   Neurological: Negative for dizziness, weakness, light-headedness and numbness.   Psychiatric/Behavioral: Negative for confusion and decreased concentration. The patient is nervous/anxious.      Objective:     Vital Signs (Most  Recent):  Temp: 96.5 °F (35.8 °C) (01/23/20 0509)  Pulse: 71 (01/23/20 0509)  Resp: 18 (01/23/20 0509)  BP: 132/79 (01/23/20 0509)  SpO2: 95 % (01/23/20 0509) Vital Signs (24h Range):  Temp:  [96.5 °F (35.8 °C)-98.8 °F (37.1 °C)] 96.5 °F (35.8 °C)  Pulse:  [71-82] 71  Resp:  [18-20] 18  SpO2:  [79 %-99 %] 95 %  BP: (118-135)/(67-79) 132/79     Weight: (!) 148 kg (326 lb 4.5 oz)  Body mass index is 49.61 kg/m².    Physical Exam   Constitutional: He is oriented to person, place, and time. Vital signs are normal. He appears well-developed and well-nourished.  Non-toxic appearance. He does not have a sickly appearance. He does not appear ill. No distress.   Obese male.    HENT:   Head: Normocephalic and atraumatic.   Right Ear: External ear normal.   Left Ear: External ear normal.   Eyes: Pupils are equal, round, and reactive to light. Conjunctivae and EOM are normal. No scleral icterus.   Neck: Normal range of motion. Neck supple. No JVD present. No tracheal deviation present.   Cardiovascular: Normal rate, regular rhythm, normal heart sounds and intact distal pulses. Exam reveals no gallop and no friction rub.   No murmur heard.  Pulmonary/Chest: Effort normal and breath sounds normal. No stridor. No respiratory distress. He has no wheezes. He has no rales.   Abdominal: Soft. Bowel sounds are normal. He exhibits no distension and no mass. There is no tenderness. There is no guarding.   Genitourinary:   Genitourinary Comments: Left side of scrotum with erythema, and a palpable indurated and fluctuant area of the anterior aspect of the left testicle with pinpoint amount of purulent drainage.    Musculoskeletal: Normal range of motion. He exhibits no edema or deformity.   Neurological: He is alert and oriented to person, place, and time. No cranial nerve deficit. He exhibits normal muscle tone. Coordination normal.   Skin: Skin is warm and dry. He is not diaphoretic.   Psychiatric: He has a normal mood and affect. His  behavior is normal. Judgment and thought content normal.   Nursing note and vitals reviewed.        CRANIAL NERVES     CN III, IV, VI   Pupils are equal, round, and reactive to light.  Extraocular motions are normal.        Significant Labs:   BMP:   Recent Labs   Lab 01/23/20  0331         K 4.6      CO2 30*   BUN 10   CREATININE 0.9   CALCIUM 9.5   MG 2.0     CBC:   Recent Labs   Lab 01/22/20  0415 01/23/20  0331   WBC 9.93 9.82   HGB 13.4* 13.0*   HCT 41.5 40.7    321     CMP:   Recent Labs   Lab 01/22/20  0415 01/23/20  0331    142   K 4.5 4.6    103   CO2 28 30*   * 109   BUN 15 10   CREATININE 0.9 0.9   CALCIUM 9.3 9.5   PROT 6.9 6.7   ALBUMIN 3.0* 2.9*   BILITOT 0.7 0.6   ALKPHOS 66 65   AST 12 14   ALT 17 20   ANIONGAP 10 9   EGFRNONAA >60.0 >60     Urine Culture: No results for input(s): LABURIN in the last 48 hours.  Urine Studies: No results for input(s): COLORU, APPEARANCEUA, PHUR, SPECGRAV, PROTEINUA, GLUCUA, KETONESU, BILIRUBINUA, OCCULTUA, NITRITE, UROBILINOGEN, LEUKOCYTESUR, RBCUA, WBCUA, BACTERIA, SQUAMEPITHEL, HYALINECASTS in the last 48 hours.    Invalid input(s): WRIGHTSUR  All pertinent labs within the past 24 hours have been reviewed.    Significant Imaging: I have reviewed all pertinent imaging results/findings within the past 24 hours.      US Soft Tissue Misc   Order: [id number]   Status:  Final result   Visible to patient:  Yes (Patient Portal) Next appt:  02/17/2020 at 02:20 PM in Internal Medicine (PEGGY Knutson)   Details     Reading Physician Reading Date Result Priority   Bethanie Styles MD 1/22/2020 Routine      Narrative     EXAMINATION:  [place] SO[place] TISSUE [facility]    CLINICAL HISTORY:  r/o abscess in draining wound;    TECHNIQUE:  Limited ultrasound images of the area of concern were obtained.    COMPARISON:  Scrotal ultrasound 01/28/2020    FINDINGS:  Limited ultrasound assessment of the subcutaneous soft tissues of the left scrotum show  edema and phlegmon throughout the subcutaneous soft tissues of the left scrotum, increased from the prior exam.  A somewhat focal area of complex material is noted in this region measuring at least 2.1 x 1.3 x 3.7 cm with a tract to the skin surface.  Increased vascularity is noted throughout this area.      Impression       Edema and increased vascularity throughout the subcutaneous soft tissues of the left scrotal tissues with an associated somewhat focal area of complex material measuring 2.1 x 1.3 x 3.7 cm with a tract to the skin surface.      Electronically signed by: Bethanie Styles MD  Date: 01/22/2020  Time: 08:21

## 2020-01-23 NOTE — SUBJECTIVE & OBJECTIVE
Past Medical History:   Diagnosis Date    Hypertension     Morbid obesity with BMI of 45.0-49.9, adult 12/26/2014    Tachycardia        Past Surgical History:   Procedure Laterality Date    APPENDECTOMY      INNER EAR SURGERY Bilateral        Review of patient's allergies indicates:   Allergen Reactions    Tramadol Other (See Comments)     heartburn       Family History     Problem Relation (Age of Onset)    Hypertension Mother, Father          Tobacco Use    Smoking status: Never Smoker    Smokeless tobacco: Never Used   Substance and Sexual Activity    Alcohol use: Yes     Comment: occasional    Drug use: No    Sexual activity: Not Currently       Review of Systems   Constitutional: Negative for chills and fever.   HENT: Negative for trouble swallowing.    Respiratory: Negative for shortness of breath.    Cardiovascular: Negative for chest pain.   Gastrointestinal: Positive for nausea. Negative for vomiting.   Genitourinary: Positive for scrotal swelling. Negative for difficulty urinating and hematuria.   Musculoskeletal: Negative for myalgias.   Skin: Positive for wound.   Neurological: Negative for weakness.   Hematological: Does not bruise/bleed easily.   Psychiatric/Behavioral: Negative for confusion.       Objective:     Temp:  [96.5 °F (35.8 °C)-98.8 °F (37.1 °C)] 96.5 °F (35.8 °C)  Pulse:  [71-82] 71  Resp:  [18-20] 18  SpO2:  [79 %-99 %] 95 %  BP: (118-135)/(67-79) 132/79     Body mass index is 49.61 kg/m².           Drains     None                 Physical Exam   Constitutional: He is oriented to person, place, and time. He appears well-developed and well-nourished.   obese   HENT:   Head: Normocephalic and atraumatic.   Eyes: Conjunctivae are normal.   Neck: Normal range of motion.   Cardiovascular: Normal rate.    Pulmonary/Chest: Effort normal. No respiratory distress.   Abdominal: Soft. He exhibits no distension. There is no tenderness.   obese   Genitourinary:   Genitourinary Comments:  Uncircumcised penis without phimosis; testes descended bilaterally, epididymides and spermatic cords unremarkable; left hemiscrotum with erythematous, indurated 3 cm lesion with purulent drainage, suspicious for abscess   Musculoskeletal: Normal range of motion.   Neurological: He is alert and oriented to person, place, and time.   Skin: Skin is warm and dry.     Psychiatric: He has a normal mood and affect. His behavior is normal. Judgment and thought content normal.       Significant Labs:    BMP:  Recent Labs   Lab 01/21/20 0445 01/22/20 0415 01/23/20  0331    140 142   K 4.5 4.5 4.6    102 103   CO2 28 28 30*   BUN 15 15 10   CREATININE 1.0 0.9 0.9   CALCIUM 9.1 9.3 9.5       CBC:  Recent Labs   Lab 01/21/20 0445 01/22/20 0415 01/23/20  0331   WBC 12.31 9.93 9.82   HGB 13.2* 13.4* 13.0*   HCT 39.5* 41.5 40.7    244 321       All pertinent labs results from the past 24 hours have been reviewed.    Significant Imaging:  All pertinent imaging results/findings from the past 24 hours have been reviewed.    US - bilateral testes and epididymides unremarkable. Left hemiscrotum with area of mixed echogenicity suspicious for abscess  I have personally reviewed and interpreted these images.

## 2020-01-23 NOTE — PROGRESS NOTES
Pharmacokinetic Initial Assessment: IV Vancomycin    Assessment/Plan:    Initiate intravenous vancomycin with loading dose of 3000 mg once followed by a maintenance dose of vancomycin 2000 mg IV every 12 hours  Desired empiric serum trough concentration is 10 to 15 mcg/mL  Draw vancomycin trough level 30 min prior to fourth dose on 01/24/20 at approximately 0530   Pharmacy will continue to follow and monitor vancomycin.      Please contact pharmacy at extension [phone] with any questions regarding this assessment.     Thank you for the consult,   Georgi Vang       Patient brief summary:  Germán Leonechris . is a 29 y.o. male initiated on antimicrobial therapy with IV Vancomycin for treatment of suspected skin & soft tissue infection    Drug Allergies:   Review of patient's allergies indicates:   Allergen Reactions    Tramadol Other (See Comments)     heartburn       Actual Body Weight:   148 kg    Renal Function:   Estimated Creatinine Clearance: 173.2 mL/min (based on SCr of 0.9 mg/dL).,     Dialysis Method (if applicable):  N/A    CBC (last 72 hours):  Recent Labs   Lab Result Units 01/20/20  0437 01/21/20 0445 01/22/20  0415   WBC K/uL 16.29* 12.31 9.93   Hemoglobin g/dL 15.4 13.2* 13.4*   Hemoglobin A1C % 5.4  --   --    Hematocrit % 45.6 39.5* 41.5   Platelets K/uL 296 247 244   Gran% % 82.2* 80.5* 73.8*   Lymph% % 8.5* 8.8* 14.4*   Mono% % 8.0 8.9 8.6   Eosinophil% % 0.9 1.1 2.3   Basophil% % 0.2 0.2 0.4   Differential Method  Automated Automated Automated       Metabolic Panel (last 72 hours):  Recent Labs   Lab Result Units 01/20/20  0437 01/20/20  0734 01/21/20 0445 01/22/20  0415   Sodium mmol/L 139  --  139 140   Potassium mmol/L 4.0  --  4.5 4.5   Chloride mmol/L 101  --  102 102   CO2 mmol/L 27  --  28 28   Glucose mg/dL 121*  --  108 145*   Glucose, UA   --  Negative  --   --    BUN, Bld mg/dL 15  --  15 15   Creatinine mg/dL 0.9  --  1.0 0.9   Albumin g/dL 3.8  --  3.1* 3.0*   Total Bilirubin  mg/dL 1.2*  --  1.4* 0.7   Alkaline Phosphatase U/L 82  --  68 66   AST U/L 13  --  11 12   ALT U/L 23  --  16 17   Magnesium mg/dL  --   --  1.9 2.0   Phosphorus mg/dL  --   --  2.6* 4.0       Drug levels (last 3 results):  No results for input(s): VANCOMYCINRA, VANCOMYCINPE, VANCOMYCINTR in the last 72 hours.    Microbiologic Results:  Microbiology Results (last 7 days)       ** No results found for the last 168 hours. **

## 2020-01-23 NOTE — DISCHARGE SUMMARY
[facility] Hospital Medicine  Discharge Summary      Patient Name: Germán De Leon Jr.  MRN: [id number]  Admission Date: 1/22/2020  Hospital Length of Stay: 1 days  Discharge Date and Time: 1/23/2020  3:16 PM  Attending Physician: Alissa att. providers found   Discharging Provider: Estrellita Martinez PA-C  Primary Care Provider: PEGGY Knutson      HPI:   Mr. Germán De Leon Jr. is a 29 y.o. male, with PMH of HTN, morbid obesity, who presented to [facility] with scrotal pain x1 day on 01/20/2020.  He found a lesion on his scrotum, which was attempted to be drained by himself and his mother with a non sterilized needle cleaned with peroxide only, and expressed blood at that time.  When he presented to the ED he was found to have cellulitis of the scrotum.  He denied dysuria, urgency, frequency, history of diabetes, polyuria, polydipsia.  He admitted to occasional alcohol use.  In the ED and ultrasound showed cellulitis of the scrotum with an abscess.  He was started on IV clindamycin, and admitted to the hospital.  He was then switched oral:  Clindamycin as he appeared to be improving, but the erythema and edema of the scrotum increased and purulent drainage began and thus he was switched back to IV clindamycin.  At the time he was switched back to IV medications, a repeat ultrasound showed a small focal area of clot complex material tracking towards the skin.  As there was no neurology available at [facility], the patient was transferred to [facility] [place] possible I and D of the scrotum by Urology at this facility he was then started on vancomycin/Zosyn.  He was inpatient status.    * No surgery found *      Hospital Course:   29 y.o. Male with PMH of HTN and morbid obesity admitted at [facility] with scrotal pain x1 day on 01/20/2020 with lesion on his scrotum, which he attempted to drain with a non sterilized needle, found to have cellulitis of the scrotum and placed on Clindamycin.  Scrotal US from 1/20 showed Normal testicular ultrasound; findings consistent with scrotal cellulitis; however course was complicated by worsening symptoms and repeat US 1/22 showed a likely abscess in the left hemiscrotum. He was then transferred to [facility] for evaluation by Urology for possible drainage. Patient was placed on Vanc and Zosyn. Initial  leukocytosis at first admission (WBC 16) has now resolved, patient remains afebrile. Patient underwent bedside I&D by Urology, cultures sent. Urology recommendation for local wound care, packing and Bactrim x 10 days. Patient and mother feel comfortable managing wound at home. Vitals stable, discharged home.      Consults:   Consults (From admission, onward)        Status Ordering Provider     Inpatient consult to Urology  Once     Provider:  Narcisa Tobin MD    Completed FAVIO LEMUS     Pharmacy to dose Vancomycin consult  Once     Provider:  (Not yet assigned)    Acknowledged FAVIO LEMUS          No new Assessment & Plan notes have been filed under this hospital service since the last note was generated.  Service: Hospital Medicine    Final Active Diagnoses:    Diagnosis Date Noted POA    PRINCIPAL PROBLEM:  Cellulitis, scrotum [N49.2] 01/20/2020 Yes    Scrotal abscess [N49.2] 01/23/2020 Yes    Cellulitis of scrotum [N49.2] 01/23/2020 Yes    HTN (hypertension) [I10] 08/26/2014 Yes     Chronic      Problems Resolved During this Admission:       Discharged Condition: stable    Disposition: Home or Self Care    Follow Up:  Follow-up Information     Schedule an appointment as soon as possible for a visit with PEGGY Knutson.    Specialty:  Family Medicine  Why:  post hospital follow-up  Contact information:  [address] [facility] [address] [address]  [phone]             Joshua Ramirez MD In 2 weeks.    Specialty:  Urology  Why:  post hospital follow-up, or you my follow with your Urologist  Contact information:  [address]  [facility] [address]  [place] [address]  [phone]                 Patient Instructions:      Diet Adult Regular     Order Specific Question Answer Comments   Fat restriction, if any: 60g Fat    Na restriction, if any: 2gNa      Other restrictions (specify):   Order Comments: Do not sit in tub, showers only     Notify your health care provider if you experience any of the following:  temperature >100.4     Notify your health care provider if you experience any of the following:  persistent nausea and vomiting or diarrhea     Notify your health care provider if you experience any of the following:  severe uncontrolled pain     Notify your health care provider if you experience any of the following:  redness, tenderness, or signs of infection (pain, swelling, redness, odor or green/yellow discharge around incision site)     Notify your health care provider if you experience any of the following:  difficulty breathing or increased cough     Notify your health care provider if you experience any of the following:  worsening rash     Notify your health care provider if you experience any of the following:  increased confusion or weakness     Notify your health care provider if you experience any of the following:   Order Comments: Continuous bleeding     Activity as tolerated       Significant Diagnostic Studies: Labs:   CMP   Recent Labs   Lab 01/22/20  0415 01/23/20  0331    142   K 4.5 4.6    103   CO2 28 30*   * 109   BUN 15 10   CREATININE 0.9 0.9   CALCIUM 9.3 9.5   PROT 6.9 6.7   ALBUMIN 3.0* 2.9*   BILITOT 0.7 0.6   ALKPHOS 66 65   AST 12 14   ALT 17 20   ANIONGAP 10 9   ESTGFRAFRICA >60.0 >60   EGFRNONAA >60.0 >60   , CBC   Recent Labs   Lab 01/22/20  0415 01/23/20  0331   WBC 9.93 9.82   HGB 13.4* 13.0*   HCT 41.5 40.7    321    and All labs within the past 24 hours have been reviewed        Pending Diagnostic Studies:     None         Medications:  Reconciled Home Medications:       Medication List      START taking these medications    oxyCODONE-acetaminophen 5-325 mg per tablet  Commonly known as:  PERCOCET  Take 1 tablet by mouth every 24 hours as needed for Pain.     sulfamethoxazole-trimethoprim 800-160mg 800-160 mg Tab  Commonly known as:  BACTRIM DS  Take 1 tablet by mouth 2 (two) times daily. for 10 days        CONTINUE taking these medications    albuterol 90 mcg/actuation inhaler  Commonly known as:  PROVENTIL/VENTOLIN HFA  Inhale 1-2 puffs into the lungs every 6 (six) hours as needed. Rescue     citalopram 10 MG tablet  Commonly known as:  CELEXA  Take 1 tablet (10 mg total) by mouth once daily.     ibuprofen 800 MG tablet  Commonly known as:  ADVIL,MOTRIN  Take 1 tablet (800 mg total) by mouth every 6 (six) hours as needed.     lisinopril 30 MG tablet  Commonly known as:  PRINIVIL,ZESTRIL  Take 1 tablet (30 mg total) by mouth once daily.     metoprolol tartrate 50 MG tablet  Commonly known as:  LOPRESSOR  Take 1 tablet (50 mg total) by mouth 2 (two) times daily.     omeprazole 20 MG capsule  Commonly known as:  PRILOSEC  Take 1 capsule (20 mg total) by mouth once daily.        STOP taking these medications    ondansetron 4 MG tablet  Commonly known as:  ZOFRAN            Indwelling Lines/Drains at time of discharge:   Lines/Drains/Airways     None                 Time spent on the discharge of patient: >30 minutes  Patient was seen and examined on the date of discharge and determined to be suitable for discharge.         Estrellita Martinez PA-C  Department of Hospital Medicine  [facility]

## 2020-01-23 NOTE — NURSING
Pt given discharge instruction and packet.   Pt given wound care instruction and wound supplies.  Instruction given to patient and mother.  Verbalizes understanding.  precription given and antibiotic sent to pharmacy.  Pt ready for discharge home with his mother.  Ambulating in room and void without difficulty.  In no distress.

## 2020-01-27 ENCOUNTER — TELEPHONE (OUTPATIENT)
Dept: UROLOGY | Facility: CLINIC | Age: 30
End: 2020-01-27

## 2020-01-27 LAB
BACTERIA SPEC AEROBE CULT: ABNORMAL
BACTERIA SPEC ANAEROBE CULT: NORMAL

## 2020-01-27 NOTE — TELEPHONE ENCOUNTER
----- Message from Marcela Reinier MA sent at 1/23/2020  9:02 AM CST -----  Contact: Nora / [facility] / ph# [phone]  Corina fay this is a Buddhism consult thanks marcela  ----- Message -----  From: Virginia Spencer  Sent: 1/23/2020   8:13 AM CST  To: Crista Brewster Staff    IN  HOUSE  CONSULT    Pt: EMILEE MARCANO JR. [[id number]]    #  B [address] A    Dx: Cellulitis / Abscess of Scrotum      Referring Physician:  Dr. EDUARDO Warner    Call back:Nora ORONA / ph# [phone]

## 2020-01-27 NOTE — TELEPHONE ENCOUNTER
----- Message from Marcela CarrilloReinier, MA sent at 1/23/2020  9:05 AM CST -----  Contact: Nora / [facility] / ph# [phone]  This is a Holiness consult.-marcela  ----- Message -----  From: Jenny Palmer  Sent: 1/23/2020   8:13 AM CST  To: Crista Brewster Staff    IN  HOUSE  CONSULT    Pt: EMILEE MARCANO JR. [[id number]]    #  B [address] A    Dx: Cellulitis / Abscess of Scrotum      Referring Physician:  Dr. EDUARDO Warner    Call back:Nora ORONA / ph# [phone]

## 2021-05-06 ENCOUNTER — PATIENT MESSAGE (OUTPATIENT)
Dept: RESEARCH | Facility: HOSPITAL | Age: 31
End: 2021-05-06

## 2021-09-21 ENCOUNTER — LAB VISIT (OUTPATIENT)
Dept: PRIMARY CARE CLINIC | Facility: OTHER | Age: 31
End: 2021-09-21
Attending: INTERNAL MEDICINE

## 2021-09-21 LAB
CTP QC/QA: YES
SARS-COV-2 AG RESP QL IA.RAPID: NEGATIVE

## 2022-12-25 DIAGNOSIS — U07.1 COVID-19 VIRUS DETECTED: ICD-10-CM

## 2024-03-06 PROBLEM — F32.A DEPRESSION: Status: ACTIVE | Noted: 2024-03-06

## 2024-03-06 PROBLEM — G47.00 INSOMNIA: Status: ACTIVE | Noted: 2024-03-06
